# Patient Record
Sex: FEMALE | Race: WHITE | NOT HISPANIC OR LATINO | URBAN - METROPOLITAN AREA
[De-identification: names, ages, dates, MRNs, and addresses within clinical notes are randomized per-mention and may not be internally consistent; named-entity substitution may affect disease eponyms.]

---

## 2017-07-11 ENCOUNTER — INPATIENT (INPATIENT)
Facility: HOSPITAL | Age: 82
LOS: 3 days | Discharge: NURSING, REHAB OR LONG-TERM CARE | End: 2017-07-15
Attending: ORTHOPAEDIC SURGERY | Admitting: ORTHOPAEDIC SURGERY
Payer: MEDICARE

## 2017-07-11 VITALS
DIASTOLIC BLOOD PRESSURE: 100 MMHG | RESPIRATION RATE: 18 BRPM | SYSTOLIC BLOOD PRESSURE: 152 MMHG | OXYGEN SATURATION: 99 % | TEMPERATURE: 97 F | HEART RATE: 89 BPM

## 2017-07-11 DIAGNOSIS — S72.009A FRACTURE OF UNSPECIFIED PART OF NECK OF UNSPECIFIED FEMUR, INITIAL ENCOUNTER FOR CLOSED FRACTURE: ICD-10-CM

## 2017-07-11 LAB
ALBUMIN SERPL ELPH-MCNC: 3.7 G/DL — SIGNIFICANT CHANGE UP (ref 3.3–5)
ALP SERPL-CCNC: 86 U/L — SIGNIFICANT CHANGE UP (ref 40–120)
ALT FLD-CCNC: 15 U/L — SIGNIFICANT CHANGE UP (ref 4–33)
APTT BLD: 26.7 SEC — LOW (ref 27.5–37.4)
AST SERPL-CCNC: 22 U/L — SIGNIFICANT CHANGE UP (ref 4–32)
BASOPHILS # BLD AUTO: 0.05 K/UL — SIGNIFICANT CHANGE UP (ref 0–0.2)
BASOPHILS NFR BLD AUTO: 0.6 % — SIGNIFICANT CHANGE UP (ref 0–2)
BILIRUB SERPL-MCNC: 0.5 MG/DL — SIGNIFICANT CHANGE UP (ref 0.2–1.2)
BLD GP AB SCN SERPL QL: NEGATIVE — SIGNIFICANT CHANGE UP
BUN SERPL-MCNC: 25 MG/DL — HIGH (ref 7–23)
CALCIUM SERPL-MCNC: 9.4 MG/DL — SIGNIFICANT CHANGE UP (ref 8.4–10.5)
CHLORIDE SERPL-SCNC: 103 MMOL/L — SIGNIFICANT CHANGE UP (ref 98–107)
CO2 SERPL-SCNC: 25 MMOL/L — SIGNIFICANT CHANGE UP (ref 22–31)
CREAT SERPL-MCNC: 1.18 MG/DL — SIGNIFICANT CHANGE UP (ref 0.5–1.3)
EOSINOPHIL # BLD AUTO: 0.14 K/UL — SIGNIFICANT CHANGE UP (ref 0–0.5)
EOSINOPHIL NFR BLD AUTO: 1.7 % — SIGNIFICANT CHANGE UP (ref 0–6)
GLUCOSE SERPL-MCNC: 116 MG/DL — HIGH (ref 70–99)
HCT VFR BLD CALC: 39.8 % — SIGNIFICANT CHANGE UP (ref 34.5–45)
HGB BLD-MCNC: 13 G/DL — SIGNIFICANT CHANGE UP (ref 11.5–15.5)
IMM GRANULOCYTES # BLD AUTO: 0.04 # — SIGNIFICANT CHANGE UP
IMM GRANULOCYTES NFR BLD AUTO: 0.5 % — SIGNIFICANT CHANGE UP (ref 0–1.5)
INR BLD: 1 — SIGNIFICANT CHANGE UP (ref 0.88–1.17)
LYMPHOCYTES # BLD AUTO: 1.63 K/UL — SIGNIFICANT CHANGE UP (ref 1–3.3)
LYMPHOCYTES # BLD AUTO: 20 % — SIGNIFICANT CHANGE UP (ref 13–44)
MCHC RBC-ENTMCNC: 30.4 PG — SIGNIFICANT CHANGE UP (ref 27–34)
MCHC RBC-ENTMCNC: 32.7 % — SIGNIFICANT CHANGE UP (ref 32–36)
MCV RBC AUTO: 93.2 FL — SIGNIFICANT CHANGE UP (ref 80–100)
MONOCYTES # BLD AUTO: 0.73 K/UL — SIGNIFICANT CHANGE UP (ref 0–0.9)
MONOCYTES NFR BLD AUTO: 8.9 % — SIGNIFICANT CHANGE UP (ref 2–14)
NEUTROPHILS # BLD AUTO: 5.58 K/UL — SIGNIFICANT CHANGE UP (ref 1.8–7.4)
NEUTROPHILS NFR BLD AUTO: 68.3 % — SIGNIFICANT CHANGE UP (ref 43–77)
NRBC # FLD: 0 — SIGNIFICANT CHANGE UP
PLATELET # BLD AUTO: 258 K/UL — SIGNIFICANT CHANGE UP (ref 150–400)
PMV BLD: 9.8 FL — SIGNIFICANT CHANGE UP (ref 7–13)
POTASSIUM SERPL-MCNC: 4.1 MMOL/L — SIGNIFICANT CHANGE UP (ref 3.5–5.3)
POTASSIUM SERPL-SCNC: 4.1 MMOL/L — SIGNIFICANT CHANGE UP (ref 3.5–5.3)
PROT SERPL-MCNC: 6.7 G/DL — SIGNIFICANT CHANGE UP (ref 6–8.3)
PROTHROM AB SERPL-ACNC: 11.2 SEC — SIGNIFICANT CHANGE UP (ref 9.8–13.1)
RBC # BLD: 4.27 M/UL — SIGNIFICANT CHANGE UP (ref 3.8–5.2)
RBC # FLD: 13.6 % — SIGNIFICANT CHANGE UP (ref 10.3–14.5)
RH IG SCN BLD-IMP: POSITIVE — SIGNIFICANT CHANGE UP
SODIUM SERPL-SCNC: 143 MMOL/L — SIGNIFICANT CHANGE UP (ref 135–145)
WBC # BLD: 8.17 K/UL — SIGNIFICANT CHANGE UP (ref 3.8–10.5)
WBC # FLD AUTO: 8.17 K/UL — SIGNIFICANT CHANGE UP (ref 3.8–10.5)

## 2017-07-11 PROCEDURE — 72125 CT NECK SPINE W/O DYE: CPT | Mod: 26

## 2017-07-11 PROCEDURE — 70450 CT HEAD/BRAIN W/O DYE: CPT | Mod: 26

## 2017-07-11 PROCEDURE — 73523 X-RAY EXAM HIPS BI 5/> VIEWS: CPT | Mod: 26

## 2017-07-11 PROCEDURE — 73552 X-RAY EXAM OF FEMUR 2/>: CPT | Mod: 26,76,RT

## 2017-07-11 RX ORDER — MORPHINE SULFATE 50 MG/1
2 CAPSULE, EXTENDED RELEASE ORAL EVERY 4 HOURS
Qty: 0 | Refills: 0 | Status: DISCONTINUED | OUTPATIENT
Start: 2017-07-11 | End: 2017-07-13

## 2017-07-11 RX ORDER — MORPHINE SULFATE 50 MG/1
3 CAPSULE, EXTENDED RELEASE ORAL EVERY 4 HOURS
Qty: 0 | Refills: 0 | Status: DISCONTINUED | OUTPATIENT
Start: 2017-07-11 | End: 2017-07-13

## 2017-07-11 RX ORDER — HEPARIN SODIUM 5000 [USP'U]/ML
5000 INJECTION INTRAVENOUS; SUBCUTANEOUS ONCE
Qty: 0 | Refills: 0 | Status: COMPLETED | OUTPATIENT
Start: 2017-07-11 | End: 2017-07-11

## 2017-07-11 RX ORDER — ACETAMINOPHEN 500 MG
650 TABLET ORAL EVERY 6 HOURS
Qty: 0 | Refills: 0 | Status: DISCONTINUED | OUTPATIENT
Start: 2017-07-11 | End: 2017-07-15

## 2017-07-11 RX ORDER — MORPHINE SULFATE 50 MG/1
4 CAPSULE, EXTENDED RELEASE ORAL ONCE
Qty: 0 | Refills: 0 | Status: DISCONTINUED | OUTPATIENT
Start: 2017-07-11 | End: 2017-07-11

## 2017-07-11 RX ORDER — SODIUM CHLORIDE 9 MG/ML
1000 INJECTION, SOLUTION INTRAVENOUS
Qty: 0 | Refills: 0 | Status: DISCONTINUED | OUTPATIENT
Start: 2017-07-11 | End: 2017-07-15

## 2017-07-11 RX ORDER — ACETAMINOPHEN 500 MG
1000 TABLET ORAL ONCE
Qty: 0 | Refills: 0 | Status: COMPLETED | OUTPATIENT
Start: 2017-07-11 | End: 2017-07-11

## 2017-07-11 RX ADMIN — SODIUM CHLORIDE 75 MILLILITER(S): 9 INJECTION, SOLUTION INTRAVENOUS at 22:53

## 2017-07-11 RX ADMIN — MORPHINE SULFATE 4 MILLIGRAM(S): 50 CAPSULE, EXTENDED RELEASE ORAL at 19:44

## 2017-07-11 RX ADMIN — MORPHINE SULFATE 4 MILLIGRAM(S): 50 CAPSULE, EXTENDED RELEASE ORAL at 18:50

## 2017-07-11 RX ADMIN — MORPHINE SULFATE 4 MILLIGRAM(S): 50 CAPSULE, EXTENDED RELEASE ORAL at 20:36

## 2017-07-11 RX ADMIN — Medication 400 MILLIGRAM(S): at 21:24

## 2017-07-11 RX ADMIN — MORPHINE SULFATE 4 MILLIGRAM(S): 50 CAPSULE, EXTENDED RELEASE ORAL at 20:15

## 2017-07-11 RX ADMIN — Medication 1000 MILLIGRAM(S): at 22:53

## 2017-07-11 NOTE — ED ADULT TRIAGE NOTE - CHIEF COMPLAINT QUOTE
Patient brought to ER after she tripped when her foot got trapped behind her. Her right hip is externally rotated.

## 2017-07-11 NOTE — ED PROVIDER NOTE - ATTENDING CONTRIBUTION TO CARE
I performed a face to face history and physical exam of the patient and discussed their management with the resident. I reviewed the resident's note and agree with the documented findings and plan of care. 89 y/o female with hx of HTN, s/p mechanical fall witnessed by aide c/o R hip pain, unable to ambulate, + head injury no LOC, no neck pain, pt with externally rotated RLE, nvi, no focal neurologic deficit but hx of hemorrhagic bleed, 1)head ct 2)X-ray hip/pelvis 3)pre-op labs 4)pain management 5)ortho consult 6)admit

## 2017-07-11 NOTE — ED ADULT NURSE NOTE - OBJECTIVE STATEMENT
Ottoniel RN: Pt received to room 10. Pt brought in s/p trip and fall. Pts aide at bedside states that pt was walking with walker when she tripped and fell. Pt complaining of right sided hip pain. Pts right hip noted to be externally rotated. Pt complaining of 10/10 pain. MD at bedside. IV access obtained, labs drawn and sent. Report given to primary RN.

## 2017-07-11 NOTE — ED PROVIDER NOTE - OBJECTIVE STATEMENT
89 year old female with history of hypertension in with ground level fall.  States was at the movies, walking, when she stumbled and fell, no loc, but does endorse hitting the back of the head. Now with right hip/leg pain.   Denies any preceeding lightheadedness, dizziness, chest pain.  Unable to ambulate after the fall. 89 year old female with history of hypertension in with ground level fall.  States was at the movies, walking, when she stumbled and fell, no loc, but does endorse hitting the back of the head. Now with right hip/leg pain.   Denies any preceeding lightheadedness, dizziness, chest pain.  Unable to ambulate after the fall,   JPATEL: no numbness, no neck pain, no back pain,

## 2017-07-11 NOTE — ED PROVIDER NOTE - MEDICAL DECISION MAKING DETAILS
89F in with right leg/hip pain s/p fall, concern for fx, xrays, preop labs, pain control, admission

## 2017-07-11 NOTE — ED PROVIDER NOTE - PHYSICAL EXAMINATION
right leg externally rotated, + limited ROM 2/2 pain. right leg externally rotated, + limited ROM 2/2 pain.  JPATEL: RLE + DP pulse right leg externally rotated, + limited ROM 2/2 pain.  JPATEL: RLE + DP pulse, NVI

## 2017-07-12 DIAGNOSIS — Z95.2 PRESENCE OF PROSTHETIC HEART VALVE: Chronic | ICD-10-CM

## 2017-07-12 DIAGNOSIS — Z01.818 ENCOUNTER FOR OTHER PREPROCEDURAL EXAMINATION: ICD-10-CM

## 2017-07-12 DIAGNOSIS — S72.009A FRACTURE OF UNSPECIFIED PART OF NECK OF UNSPECIFIED FEMUR, INITIAL ENCOUNTER FOR CLOSED FRACTURE: ICD-10-CM

## 2017-07-12 LAB
APPEARANCE UR: CLEAR — SIGNIFICANT CHANGE UP
BILIRUB UR-MCNC: SIGNIFICANT CHANGE UP
BLD GP AB SCN SERPL QL: NEGATIVE — SIGNIFICANT CHANGE UP
BLOOD UR QL VISUAL: NEGATIVE — SIGNIFICANT CHANGE UP
BUN SERPL-MCNC: 23 MG/DL — SIGNIFICANT CHANGE UP (ref 7–23)
CALCIUM SERPL-MCNC: 9.1 MG/DL — SIGNIFICANT CHANGE UP (ref 8.4–10.5)
CHLORIDE SERPL-SCNC: 101 MMOL/L — SIGNIFICANT CHANGE UP (ref 98–107)
CO2 SERPL-SCNC: 26 MMOL/L — SIGNIFICANT CHANGE UP (ref 22–31)
COLOR SPEC: YELLOW — SIGNIFICANT CHANGE UP
CREAT SERPL-MCNC: 0.93 MG/DL — SIGNIFICANT CHANGE UP (ref 0.5–1.3)
GLUCOSE SERPL-MCNC: 139 MG/DL — HIGH (ref 70–99)
GLUCOSE UR-MCNC: NEGATIVE — SIGNIFICANT CHANGE UP
HCT VFR BLD CALC: 36.6 % — SIGNIFICANT CHANGE UP (ref 34.5–45)
HGB BLD-MCNC: 11.9 G/DL — SIGNIFICANT CHANGE UP (ref 11.5–15.5)
INR BLD: 1 — SIGNIFICANT CHANGE UP (ref 0.88–1.17)
KETONES UR-MCNC: NEGATIVE — SIGNIFICANT CHANGE UP
LEUKOCYTE ESTERASE UR-ACNC: NEGATIVE — SIGNIFICANT CHANGE UP
MCHC RBC-ENTMCNC: 31.1 PG — SIGNIFICANT CHANGE UP (ref 27–34)
MCHC RBC-ENTMCNC: 32.5 % — SIGNIFICANT CHANGE UP (ref 32–36)
MCV RBC AUTO: 95.6 FL — SIGNIFICANT CHANGE UP (ref 80–100)
NITRITE UR-MCNC: NEGATIVE — SIGNIFICANT CHANGE UP
NRBC # FLD: 0 — SIGNIFICANT CHANGE UP
PH UR: 5.5 — SIGNIFICANT CHANGE UP (ref 5–8)
PLATELET # BLD AUTO: 249 K/UL — SIGNIFICANT CHANGE UP (ref 150–400)
PMV BLD: 10.9 FL — SIGNIFICANT CHANGE UP (ref 7–13)
POTASSIUM SERPL-MCNC: 4.2 MMOL/L — SIGNIFICANT CHANGE UP (ref 3.5–5.3)
POTASSIUM SERPL-SCNC: 4.2 MMOL/L — SIGNIFICANT CHANGE UP (ref 3.5–5.3)
PROT UR-MCNC: 30 — HIGH
PROTHROM AB SERPL-ACNC: 11.2 SEC — SIGNIFICANT CHANGE UP (ref 9.8–13.1)
RBC # BLD: 3.83 M/UL — SIGNIFICANT CHANGE UP (ref 3.8–5.2)
RBC # FLD: 13.5 % — SIGNIFICANT CHANGE UP (ref 10.3–14.5)
RH IG SCN BLD-IMP: POSITIVE — SIGNIFICANT CHANGE UP
RH IG SCN BLD-IMP: POSITIVE — SIGNIFICANT CHANGE UP
SODIUM SERPL-SCNC: 140 MMOL/L — SIGNIFICANT CHANGE UP (ref 135–145)
SP GR SPEC: 1.03 — SIGNIFICANT CHANGE UP (ref 1–1.03)
UROBILINOGEN FLD QL: NORMAL E.U. — SIGNIFICANT CHANGE UP (ref 0.2–1)
WBC # BLD: 8.6 K/UL — SIGNIFICANT CHANGE UP (ref 3.8–10.5)
WBC # FLD AUTO: 8.6 K/UL — SIGNIFICANT CHANGE UP (ref 3.8–10.5)

## 2017-07-12 PROCEDURE — 93306 TTE W/DOPPLER COMPLETE: CPT | Mod: 26

## 2017-07-12 PROCEDURE — 71010: CPT | Mod: 26

## 2017-07-12 PROCEDURE — 73501 X-RAY EXAM HIP UNI 1 VIEW: CPT | Mod: 26,RT

## 2017-07-12 PROCEDURE — 99223 1ST HOSP IP/OBS HIGH 75: CPT

## 2017-07-12 RX ORDER — DOCUSATE SODIUM 100 MG
1 CAPSULE ORAL
Qty: 0 | Refills: 0 | COMMUNITY

## 2017-07-12 RX ORDER — DILTIAZEM HCL 120 MG
1 CAPSULE, EXT RELEASE 24 HR ORAL
Qty: 0 | Refills: 0 | COMMUNITY

## 2017-07-12 RX ORDER — HEPARIN SODIUM 5000 [USP'U]/ML
5000 INJECTION INTRAVENOUS; SUBCUTANEOUS ONCE
Qty: 0 | Refills: 0 | Status: COMPLETED | OUTPATIENT
Start: 2017-07-12 | End: 2017-07-12

## 2017-07-12 RX ORDER — DILTIAZEM HCL 120 MG
180 CAPSULE, EXT RELEASE 24 HR ORAL DAILY
Qty: 0 | Refills: 0 | Status: DISCONTINUED | OUTPATIENT
Start: 2017-07-12 | End: 2017-07-15

## 2017-07-12 RX ORDER — DOCUSATE SODIUM 100 MG
100 CAPSULE ORAL
Qty: 0 | Refills: 0 | Status: DISCONTINUED | OUTPATIENT
Start: 2017-07-12 | End: 2017-07-13

## 2017-07-12 RX ORDER — LEVOTHYROXINE SODIUM 125 MCG
150 TABLET ORAL DAILY
Qty: 0 | Refills: 0 | Status: DISCONTINUED | OUTPATIENT
Start: 2017-07-12 | End: 2017-07-15

## 2017-07-12 RX ORDER — ATORVASTATIN CALCIUM 80 MG/1
1 TABLET, FILM COATED ORAL
Qty: 0 | Refills: 0 | COMMUNITY

## 2017-07-12 RX ORDER — ATORVASTATIN CALCIUM 80 MG/1
20 TABLET, FILM COATED ORAL AT BEDTIME
Qty: 0 | Refills: 0 | Status: DISCONTINUED | OUTPATIENT
Start: 2017-07-12 | End: 2017-07-15

## 2017-07-12 RX ORDER — LEVOTHYROXINE SODIUM 125 MCG
1 TABLET ORAL
Qty: 0 | Refills: 0 | COMMUNITY

## 2017-07-12 RX ORDER — FUROSEMIDE 40 MG
1 TABLET ORAL
Qty: 0 | Refills: 0 | COMMUNITY

## 2017-07-12 RX ADMIN — MORPHINE SULFATE 2 MILLIGRAM(S): 50 CAPSULE, EXTENDED RELEASE ORAL at 11:11

## 2017-07-12 RX ADMIN — MORPHINE SULFATE 3 MILLIGRAM(S): 50 CAPSULE, EXTENDED RELEASE ORAL at 01:45

## 2017-07-12 RX ADMIN — MORPHINE SULFATE 2 MILLIGRAM(S): 50 CAPSULE, EXTENDED RELEASE ORAL at 11:23

## 2017-07-12 RX ADMIN — Medication 100 MILLIGRAM(S): at 08:43

## 2017-07-12 RX ADMIN — MORPHINE SULFATE 3 MILLIGRAM(S): 50 CAPSULE, EXTENDED RELEASE ORAL at 00:57

## 2017-07-12 RX ADMIN — HEPARIN SODIUM 5000 UNIT(S): 5000 INJECTION INTRAVENOUS; SUBCUTANEOUS at 11:10

## 2017-07-12 RX ADMIN — ATORVASTATIN CALCIUM 20 MILLIGRAM(S): 80 TABLET, FILM COATED ORAL at 22:04

## 2017-07-12 RX ADMIN — Medication 150 MICROGRAM(S): at 08:44

## 2017-07-12 RX ADMIN — Medication 180 MILLIGRAM(S): at 08:43

## 2017-07-12 RX ADMIN — MORPHINE SULFATE 3 MILLIGRAM(S): 50 CAPSULE, EXTENDED RELEASE ORAL at 16:14

## 2017-07-12 RX ADMIN — MORPHINE SULFATE 3 MILLIGRAM(S): 50 CAPSULE, EXTENDED RELEASE ORAL at 08:29

## 2017-07-12 RX ADMIN — MORPHINE SULFATE 3 MILLIGRAM(S): 50 CAPSULE, EXTENDED RELEASE ORAL at 16:29

## 2017-07-12 RX ADMIN — Medication 100 MILLIGRAM(S): at 18:17

## 2017-07-12 RX ADMIN — MORPHINE SULFATE 3 MILLIGRAM(S): 50 CAPSULE, EXTENDED RELEASE ORAL at 09:16

## 2017-07-12 RX ADMIN — HEPARIN SODIUM 5000 UNIT(S): 5000 INJECTION INTRAVENOUS; SUBCUTANEOUS at 00:48

## 2017-07-12 NOTE — PROGRESS NOTE ADULT - SUBJECTIVE AND OBJECTIVE BOX
Diagnosis: Right Hip Interttrochanteric Fracture   Surgeon: Dr Jairo Maza  Procedure: R Hip IMN    Labs:                        11.9   8.60  )-----------( 249      ( 2017 07:15 )             36.6       07-12    140  |  101  |  23  ----------------------------<  139<H>  4.2   |  26  |  0.93    Ca    9.1      2017 07:15    TPro  6.7  /  Alb  3.7  /  TBili  0.5  /  DBili  x   /  AST  22  /  ALT  15  /  AlkPhos  86  07-11      PT/INR - ( 2017 07:15 )   PT: 11.2 SEC;   INR: 1.00          PTT - ( 2017 18:53 )  PTT:26.7 SEC    Type and Screen X 2: 1 complete, 1 pending    Urinalysis Basic - ( 2017 07:21 )    Color: YELLOW / Appearance: CLEAR / S.028 / pH: 5.5  Gluc: NEGATIVE / Ketone: NEGATIVE  / Bili: SMALL / Urobili: NORMAL E.U.   Blood: NEGATIVE / Protein: 30 / Nitrite: NEGATIVE   Leuk Esterase: NEGATIVE / RBC: x / WBC x   Sq Epi: x / Non Sq Epi: x / Bacteria: x        EKG: In chart  ECHO: Complete  CXR: Clear Lungs  Consent: Complete  Clearance: Cleared

## 2017-07-12 NOTE — CONSULT NOTE ADULT - PROBLEM SELECTOR RECOMMENDATION 9
-per aid Lee Ann at [facility] who knows pt well, has been with her for last 5 years, pt ambulates with walker for long distances with no limitations posed by cp or sob. Pt with no h/o cad/mi, although with listed h/o diastolic HF. No h/o dm, ckd, pvd, stroke/tia. pt tolerated 2011 hip repair with no cardiovascular complications.   Pt with acceptable Tapia risk index score for OR  -d/w ortho resident -per aid Lee Ann at [facility] who knows pt well, has been with her for last 5 years, pt ambulates with walker for long distances with no limitations posed by cp or sob. Pt with no h/o cad/mi, although with listed h/o diastolic HF. No h/o dm, ckd, pvd, stroke/tia, although with h/o subdural hematoma 3 years ago.  pt tolerated 2011 hip repair with no cardiovascular complications.   Pt with acceptable Tapia risk index score for OR  -d/w ortho resident -per aid Lee Ann at [facility] who knows pt well, has been with her for last 5 years, pt ambulates with walker for long distances with no limitations posed by cp or sob. Pt with no h/o cad/mi, although with listed h/o diastolic HF. No h/o dm, ckd, pvd, stroke/tia, although with h/o subdural hematoma 3 years ago.  pt tolerated 2011 hip repair with no cardiovascular complications.   Pt with acceptable Tapia risk index score for OR; however, in setting of known valvular disease, would obtain TTE prior to OR  -d/w ortho resident

## 2017-07-12 NOTE — H&P ADULT - NSHPPHYSICALEXAM_GEN_ALL_CORE
Exam:  Gen: NAD, skin intact, RLE shortened, externally rotated  Motor: EHL/FHL/TA/Gastrocnemius grossly intact  Sensory: SILT DP/SP/S/S/T Nerve Distributions  Vascular: 2+ Dorsalis Pedis pulse

## 2017-07-12 NOTE — H&P ADULT - HISTORY OF PRESENT ILLNESS
89 year old female presented to the [facility] ED following a fall at the movies. Patient tripped over her feet and fell onto her right side. She was unable to ambulate following the fall. At baseline patient walks with a walker. She does not take any anticoagulants. She reports hitting the back of her head but there was no LOC. No chest pain or shortness of breath. No recent fevers or chills.

## 2017-07-12 NOTE — H&P ADULT - ASSESSMENT
A/P: 89 year old female with right IT fracture  - Admit to Ortho  - OR Planning  - Preoperative Labs: CBC, BMP, PT/INR, Type and Screen, UA, CXR, EKG  - Consent in chart  - Medicine Clearance  - NPO  - Hold anticoagulation for OR

## 2017-07-12 NOTE — PROGRESS NOTE ADULT - SUBJECTIVE AND OBJECTIVE BOX
Orthopaedic Surgery Preop Note    Procedure: R IM Nail  Surgeon: Link    07-11    143  |  103  |  25<H>  ----------------------------<  116<H>  4.1   |  25  |  1.18    Ca    9.4      11 Jul 2017 18:53    TPro  6.7  /  Alb  3.7  /  TBili  0.5  /  DBili  x   /  AST  22  /  ALT  15  /  AlkPhos  86  07-11                          13.0   8.17  )-----------( 258      ( 11 Jul 2017 18:53 )             39.8     PT/INR - ( 11 Jul 2017 18:53 )   PT: 11.2 SEC;   INR: 1.00          PTT - ( 11 Jul 2017 18:53 )  PTT:26.7 SEC    - T/S x 1 done    89y Female to undergo xx  - NPO pMN  - IVF when NPO  - Consent in chart  - Plan for OR tomorrow

## 2017-07-12 NOTE — CONSULT NOTE ADULT - SUBJECTIVE AND OBJECTIVE BOX
Patient is a 89y old  Female who presents with a chief complaint of Hip Fracture (12 Jul 2017 00:11)      HPI:  89 year old female presented to the [facility] ED following a fall at the movies. Patient tripped over her feet and fell onto her right side. She was unable to ambulate following the fall. At baseline patient walks with a walker. She does not take any anticoagulants. She reports hitting the back of her head but there was no LOC. No chest pain or shortness of breath. No recent fevers or chills. (12 Jul 2017 00:11). Imaging + for right hip fracture      History limited due to: [ ] Dementia      Pain Symptoms if applicable:             	                          7-10  Pain:  Location: right  hip	  Modifying factors:	  Associated symptoms:	    Function:ambulates with cane    Allergies    No Known Allergies    Intolerances        HOME MEDICATIONS: [ ] Reviewed    MEDICATIONS  (STANDING):  lactated ringers. 1000 milliLiter(s) (75 mL/Hr) IV Continuous <Continuous>  heparin  Injectable 5000 Unit(s) SubCutaneous once  diltiazem    milliGRAM(s) Oral daily  atorvastatin 20 milliGRAM(s) Oral at bedtime  docusate sodium 100 milliGRAM(s) Oral two times a day  levothyroxine 150 MICROGram(s) Oral daily    MEDICATIONS  (PRN):  morphine  - Injectable 2 milliGRAM(s) IV Push every 4 hours PRN Moderate Pain (4 - 6)  morphine  - Injectable 3 milliGRAM(s) IV Push every 4 hours PRN Severe Pain (7 - 10)  acetaminophen   Tablet 650 milliGRAM(s) Oral every 6 hours PRN For Temp greater than 38 C (100.4 F)      PAST MEDICAL & SURGICAL HISTORY:  Hypertension  S/P AVR  diastolic chf    REVIEW OF SYSTEMS:       Unable to obtain due to poor mental status, does endorse right leg pain. Denies cp, sob    Vital Signs Last 24 Hrs  T(C): 36.1 (11 Jul 2017 21:12), Max: 36.7 (11 Jul 2017 18:52)  T(F): 97 (11 Jul 2017 21:12), Max: 98 (11 Jul 2017 18:52)  HR: 97 (11 Jul 2017 21:12) (78 - 97)  BP: 180/84 (11 Jul 2017 21:12) (152/100 - 180/84)  BP(mean): --  RR: 18 (11 Jul 2017 21:12) (18 - 18)  SpO2: 98% (11 Jul 2017 21:12) (98% - 99%)    PHYSICAL EXAM:    GENERAL: NAD, well-groomed, well-developed  HEAD:  Atraumatic, Normocephalic  EYES: EOMI, PERRLA, conjunctiva and sclera clear  ENMT: Moist mucous membranes  NECK: Supple, No JVD  RESPIRATORY: Clear to auscultation bilaterally; No rales, rhonchi, wheezing, or rubs  CARDIOVASCULAR: Regular rate and rhythm; No murmurs, rubs, or gallops  GASTROINTESTINAL: Soft, Nontender, Nondistended; Bowel sounds present  GENITOURINARY: Not examined  EXTREMITIES:  2+ Peripheral Pulses, No clubbing, cyanosis, or edema  NERVOUS SYSTEM:  follows commands, a/o x2, poor short-term recall  HEME/LYMPH: No lymphadenopathy noted  SKIN: No rashes or lesions; Incisions C/D/I    LABS:                        13.0   8.17  )-----------( 258      ( 11 Jul 2017 18:53 )             39.8     Hemoglobin: 13.0 g/dL (07-11 @ 18:53)    07-11    143  |  103  |  25<H>  ----------------------------<  116<H>  4.1   |  25  |  1.18    Ca    9.4      11 Jul 2017 18:53    TPro  6.7  /  Alb  3.7  /  TBili  0.5  /  DBili  x   /  AST  22  /  ALT  15  /  AlkPhos  86  07-11    PT/INR - ( 11 Jul 2017 18:53 )   PT: 11.2 SEC;   INR: 1.00          PTT - ( 11 Jul 2017 18:53 )  PTT:26.7 SEC    CAPILLARY BLOOD GLUCOSE              Imaging:   Personally Reviewed:  [ ] YES               [

## 2017-07-12 NOTE — PROGRESS NOTE ADULT - PROBLEM SELECTOR PLAN 1
-NPO and IVF @ midnight  -pain control/analgesia  -Hold Anticoagulation  -Inc Spirometry  -Cleared by Medical Attending  -F/U Pending AM Labs  -Notify Ortho with any questions

## 2017-07-12 NOTE — PROGRESS NOTE ADULT - SUBJECTIVE AND OBJECTIVE BOX
No acute events overnight. Pain well controlled with pain medications.    Vital Signs Last 24 Hrs  T(C): 36.7 (12 Jul 2017 00:58), Max: 36.7 (11 Jul 2017 18:52)  T(F): 98 (12 Jul 2017 00:58), Max: 98 (11 Jul 2017 18:52)  HR: 89 (12 Jul 2017 00:58) (78 - 97)  BP: 159/82 (12 Jul 2017 00:58) (152/100 - 180/84)  BP(mean): --  RR: 18 (12 Jul 2017 00:58) (18 - 18)  SpO2: 100% (12 Jul 2017 00:58) (98% - 100%)    Exam:  Gen: NAD, skin intact  Motor: EHL/FHL/TA/Gastrocnemius grossly intact  Sensory: SILT DP/SP/S/S/T nerve distributions  Dressing: Clean, Dry, Intact    A/P: 89 year old female with R IT fx  - Pain Control  - NPO  - Hold AC for OR  - Follow up TTE  - Follow up Medicine Clearance  - OR today

## 2017-07-13 DIAGNOSIS — E03.9 HYPOTHYROIDISM, UNSPECIFIED: ICD-10-CM

## 2017-07-13 DIAGNOSIS — I10 ESSENTIAL (PRIMARY) HYPERTENSION: ICD-10-CM

## 2017-07-13 LAB
APTT BLD: 28.7 SEC — SIGNIFICANT CHANGE UP (ref 27.5–37.4)
BASOPHILS # BLD AUTO: 0.02 K/UL — SIGNIFICANT CHANGE UP (ref 0–0.2)
BASOPHILS NFR BLD AUTO: 0.2 % — SIGNIFICANT CHANGE UP (ref 0–2)
BUN SERPL-MCNC: 18 MG/DL — SIGNIFICANT CHANGE UP (ref 7–23)
BUN SERPL-MCNC: 18 MG/DL — SIGNIFICANT CHANGE UP (ref 7–23)
CALCIUM SERPL-MCNC: 8.6 MG/DL — SIGNIFICANT CHANGE UP (ref 8.4–10.5)
CALCIUM SERPL-MCNC: 8.7 MG/DL — SIGNIFICANT CHANGE UP (ref 8.4–10.5)
CHLORIDE SERPL-SCNC: 103 MMOL/L — SIGNIFICANT CHANGE UP (ref 98–107)
CHLORIDE SERPL-SCNC: 99 MMOL/L — SIGNIFICANT CHANGE UP (ref 98–107)
CO2 SERPL-SCNC: 24 MMOL/L — SIGNIFICANT CHANGE UP (ref 22–31)
CO2 SERPL-SCNC: 25 MMOL/L — SIGNIFICANT CHANGE UP (ref 22–31)
CREAT SERPL-MCNC: 0.84 MG/DL — SIGNIFICANT CHANGE UP (ref 0.5–1.3)
CREAT SERPL-MCNC: 0.9 MG/DL — SIGNIFICANT CHANGE UP (ref 0.5–1.3)
EOSINOPHIL # BLD AUTO: 0.02 K/UL — SIGNIFICANT CHANGE UP (ref 0–0.5)
EOSINOPHIL NFR BLD AUTO: 0.2 % — SIGNIFICANT CHANGE UP (ref 0–6)
GLUCOSE SERPL-MCNC: 122 MG/DL — HIGH (ref 70–99)
GLUCOSE SERPL-MCNC: 134 MG/DL — HIGH (ref 70–99)
HCT VFR BLD CALC: 27.7 % — LOW (ref 34.5–45)
HCT VFR BLD CALC: 32.4 % — LOW (ref 34.5–45)
HGB BLD-MCNC: 10.9 G/DL — LOW (ref 11.5–15.5)
HGB BLD-MCNC: 9.2 G/DL — LOW (ref 11.5–15.5)
IMM GRANULOCYTES # BLD AUTO: 0.07 # — SIGNIFICANT CHANGE UP
IMM GRANULOCYTES NFR BLD AUTO: 0.6 % — SIGNIFICANT CHANGE UP (ref 0–1.5)
INR BLD: 1.16 — SIGNIFICANT CHANGE UP (ref 0.88–1.17)
LYMPHOCYTES # BLD AUTO: 0.65 K/UL — LOW (ref 1–3.3)
LYMPHOCYTES # BLD AUTO: 6 % — LOW (ref 13–44)
MCHC RBC-ENTMCNC: 32.1 PG — SIGNIFICANT CHANGE UP (ref 27–34)
MCHC RBC-ENTMCNC: 32.3 PG — SIGNIFICANT CHANGE UP (ref 27–34)
MCHC RBC-ENTMCNC: 33.2 % — SIGNIFICANT CHANGE UP (ref 32–36)
MCHC RBC-ENTMCNC: 33.6 % — SIGNIFICANT CHANGE UP (ref 32–36)
MCV RBC AUTO: 96.1 FL — SIGNIFICANT CHANGE UP (ref 80–100)
MCV RBC AUTO: 96.5 FL — SIGNIFICANT CHANGE UP (ref 80–100)
MONOCYTES # BLD AUTO: 0.88 K/UL — SIGNIFICANT CHANGE UP (ref 0–0.9)
MONOCYTES NFR BLD AUTO: 8.1 % — SIGNIFICANT CHANGE UP (ref 2–14)
NEUTROPHILS # BLD AUTO: 9.28 K/UL — HIGH (ref 1.8–7.4)
NEUTROPHILS NFR BLD AUTO: 84.9 % — HIGH (ref 43–77)
NRBC # FLD: 0 — SIGNIFICANT CHANGE UP
NRBC # FLD: 0 — SIGNIFICANT CHANGE UP
PLATELET # BLD AUTO: 189 K/UL — SIGNIFICANT CHANGE UP (ref 150–400)
PLATELET # BLD AUTO: 210 K/UL — SIGNIFICANT CHANGE UP (ref 150–400)
PMV BLD: 10.5 FL — SIGNIFICANT CHANGE UP (ref 7–13)
PMV BLD: 10.6 FL — SIGNIFICANT CHANGE UP (ref 7–13)
POTASSIUM SERPL-MCNC: 4.2 MMOL/L — SIGNIFICANT CHANGE UP (ref 3.5–5.3)
POTASSIUM SERPL-MCNC: 4.3 MMOL/L — SIGNIFICANT CHANGE UP (ref 3.5–5.3)
POTASSIUM SERPL-SCNC: 4.2 MMOL/L — SIGNIFICANT CHANGE UP (ref 3.5–5.3)
POTASSIUM SERPL-SCNC: 4.3 MMOL/L — SIGNIFICANT CHANGE UP (ref 3.5–5.3)
PROTHROM AB SERPL-ACNC: 13 SEC — SIGNIFICANT CHANGE UP (ref 9.8–13.1)
RBC # BLD: 2.87 M/UL — LOW (ref 3.8–5.2)
RBC # BLD: 3.37 M/UL — LOW (ref 3.8–5.2)
RBC # FLD: 13.5 % — SIGNIFICANT CHANGE UP (ref 10.3–14.5)
RBC # FLD: 13.6 % — SIGNIFICANT CHANGE UP (ref 10.3–14.5)
SODIUM SERPL-SCNC: 138 MMOL/L — SIGNIFICANT CHANGE UP (ref 135–145)
SODIUM SERPL-SCNC: 140 MMOL/L — SIGNIFICANT CHANGE UP (ref 135–145)
WBC # BLD: 10.92 K/UL — HIGH (ref 3.8–10.5)
WBC # BLD: 11.2 K/UL — HIGH (ref 3.8–10.5)
WBC # FLD AUTO: 10.92 K/UL — HIGH (ref 3.8–10.5)
WBC # FLD AUTO: 11.2 K/UL — HIGH (ref 3.8–10.5)

## 2017-07-13 PROCEDURE — 99233 SBSQ HOSP IP/OBS HIGH 50: CPT

## 2017-07-13 RX ORDER — DOCUSATE SODIUM 100 MG
100 CAPSULE ORAL THREE TIMES A DAY
Qty: 0 | Refills: 0 | Status: DISCONTINUED | OUTPATIENT
Start: 2017-07-13 | End: 2017-07-13

## 2017-07-13 RX ORDER — OXYCODONE HYDROCHLORIDE 5 MG/1
5 TABLET ORAL EVERY 4 HOURS
Qty: 0 | Refills: 0 | Status: DISCONTINUED | OUTPATIENT
Start: 2017-07-13 | End: 2017-07-15

## 2017-07-13 RX ORDER — FAMOTIDINE 10 MG/ML
20 INJECTION INTRAVENOUS EVERY 12 HOURS
Qty: 0 | Refills: 0 | Status: DISCONTINUED | OUTPATIENT
Start: 2017-07-13 | End: 2017-07-15

## 2017-07-13 RX ORDER — ONDANSETRON 8 MG/1
4 TABLET, FILM COATED ORAL EVERY 6 HOURS
Qty: 0 | Refills: 0 | Status: DISCONTINUED | OUTPATIENT
Start: 2017-07-13 | End: 2017-07-15

## 2017-07-13 RX ORDER — HYDROMORPHONE HYDROCHLORIDE 2 MG/ML
0.5 INJECTION INTRAMUSCULAR; INTRAVENOUS; SUBCUTANEOUS EVERY 6 HOURS
Qty: 0 | Refills: 0 | Status: DISCONTINUED | OUTPATIENT
Start: 2017-07-13 | End: 2017-07-15

## 2017-07-13 RX ORDER — ALBUMIN HUMAN 25 %
250 VIAL (ML) INTRAVENOUS ONCE
Qty: 0 | Refills: 0 | Status: COMPLETED | OUTPATIENT
Start: 2017-07-13 | End: 2017-07-13

## 2017-07-13 RX ORDER — ENOXAPARIN SODIUM 100 MG/ML
30 INJECTION SUBCUTANEOUS EVERY 24 HOURS
Qty: 0 | Refills: 0 | Status: DISCONTINUED | OUTPATIENT
Start: 2017-07-13 | End: 2017-07-15

## 2017-07-13 RX ORDER — MAGNESIUM HYDROXIDE 400 MG/1
30 TABLET, CHEWABLE ORAL DAILY
Qty: 0 | Refills: 0 | Status: DISCONTINUED | OUTPATIENT
Start: 2017-07-13 | End: 2017-07-15

## 2017-07-13 RX ORDER — CEFAZOLIN SODIUM 1 G
2000 VIAL (EA) INJECTION EVERY 8 HOURS
Qty: 0 | Refills: 0 | Status: COMPLETED | OUTPATIENT
Start: 2017-07-13 | End: 2017-07-14

## 2017-07-13 RX ORDER — ACETAMINOPHEN 500 MG
650 TABLET ORAL EVERY 6 HOURS
Qty: 0 | Refills: 0 | Status: DISCONTINUED | OUTPATIENT
Start: 2017-07-13 | End: 2017-07-15

## 2017-07-13 RX ADMIN — Medication 100 MILLIGRAM(S): at 05:45

## 2017-07-13 RX ADMIN — ATORVASTATIN CALCIUM 20 MILLIGRAM(S): 80 TABLET, FILM COATED ORAL at 23:16

## 2017-07-13 RX ADMIN — Medication 180 MILLIGRAM(S): at 05:45

## 2017-07-13 RX ADMIN — Medication 500 MILLILITER(S): at 17:22

## 2017-07-13 RX ADMIN — SODIUM CHLORIDE 75 MILLILITER(S): 9 INJECTION, SOLUTION INTRAVENOUS at 07:59

## 2017-07-13 RX ADMIN — MORPHINE SULFATE 2 MILLIGRAM(S): 50 CAPSULE, EXTENDED RELEASE ORAL at 08:15

## 2017-07-13 RX ADMIN — Medication 150 MICROGRAM(S): at 05:46

## 2017-07-13 RX ADMIN — MORPHINE SULFATE 2 MILLIGRAM(S): 50 CAPSULE, EXTENDED RELEASE ORAL at 08:00

## 2017-07-13 RX ADMIN — Medication 100 MILLIGRAM(S): at 23:16

## 2017-07-13 NOTE — PROGRESS NOTE ADULT - ASSESSMENT
89 yo f for preop clearance for right hip fracture following mechanical fall 89 y.o. Female w/ hx HTN, hypothyroidism, s/p AVR 10 years ago p/w mechanical Fall c/w Right hip fracture for ORIF today.

## 2017-07-13 NOTE — BRIEF OPERATIVE NOTE - OPERATION/FINDINGS
Dx: R IT hip fracture  Procedure: R hip IMN. 125 degree short gamma nail. 90 lag screw. 35 distal locking screw

## 2017-07-13 NOTE — PROGRESS NOTE ADULT - SUBJECTIVE AND OBJECTIVE BOX
PRE-OP NOTE    This is a 89 year old female who fell while walking to go to a movie sustaining a right intertrochanteric hip fracture.  She has been medically optimized by the medical service in preparation for surgery and medically cleared.  She is indicated for an ORIF using a short Gamma nail fixation device to stabilize her fracture, decrease her pain, and allow her to get OOB.  I have spoken with the patient's son and the patient and even though the patient is a high risk candidate for any surgical procedure due to her age this is a reasonable procedure to perform to stabilize her fracture and decrease her pain.  Non operative treatment also has a high risk for complications including death from pneumonia and DVT and PE (the most common reasons).  The risks  of surgery are anesthesia, infection, blood loss transfusion, DVT, PE, loss of fixation, nerve injury but these risks are outweighed by the benefits of surgery already listed.  She is indicated for an ORIF of the right hip to be performed later on today.

## 2017-07-13 NOTE — PROGRESS NOTE ADULT - SUBJECTIVE AND OBJECTIVE BOX
ORTHO PROGRESS NOTE     Pt seen and examined at bedside.  No significant overnight events. Pain well controlled.    ICU Vital Signs Last 24 Hrs  T(C): 36.7 (13 Jul 2017 05:44), Max: 36.9 (12 Jul 2017 22:03)  T(F): 98.1 (13 Jul 2017 05:44), Max: 98.5 (12 Jul 2017 22:03)  HR: 97 (13 Jul 2017 05:44) (83 - 98)  BP: 136/71 (13 Jul 2017 05:44) (116/66 - 150/66)  BP(mean): --  ABP: --  ABP(mean): --  RR: 18 (13 Jul 2017 05:44) (18 - 19)  SpO2: 97% (13 Jul 2017 05:44) (95% - 97%)    Gen: No apparent distress, alert    LE:  skin intact          +DF/PF. moving toes          SILT, wwp at foot          compartments soft    A/P: 89 year old female with R IT fx  - Pain Control  - NPO  - Hold AC for OR  - Follow up Medicine Clearance note  - OR today

## 2017-07-13 NOTE — PROGRESS NOTE ADULT - SUBJECTIVE AND OBJECTIVE BOX
Patient is a 89y old  Female who presents with a chief complaint of Hip Fracture (2017 00:11)      SUBJECTIVE / OVERNIGHT EVENTS:  Patient has no new complaints. No cp, SOB, abdominal pain, N/V/D.     MEDICATIONS  (STANDING):  lactated ringers. 1000 milliLiter(s) (75 mL/Hr) IV Continuous <Continuous>  diltiazem    milliGRAM(s) Oral daily  atorvastatin 20 milliGRAM(s) Oral at bedtime  docusate sodium 100 milliGRAM(s) Oral two times a day  levothyroxine 150 MICROGram(s) Oral daily    MEDICATIONS  (PRN):  morphine  - Injectable 2 milliGRAM(s) IV Push every 4 hours PRN Moderate Pain (4 - 6)  morphine  - Injectable 3 milliGRAM(s) IV Push every 4 hours PRN Severe Pain (7 - 10)  acetaminophen   Tablet 650 milliGRAM(s) Oral every 6 hours PRN For Temp greater than 38 C (100.4 F)      Vital Signs Last 24 Hrs  T(C): 37.6 (17 @ 09:56), Max: 37.6 (17 @ 09:56)  HR: 86 (17 @ 09:56) (86 - 98)  BP: 111/52 (17 @ 09:56) (111/52 - 150/66)  RR: 17 (17 @ 09:56) (17 - 19)  SpO2: 93% (17 @ 09:56) (93% - 97%)  CAPILLARY BLOOD GLUCOSE        I&O's Summary      -  2017 07:00  --------------------------------------------------------  IN: 0 mL / OUT: 200 mL / NET: -200 mL      -  2017 12:49  --------------------------------------------------------  IN: 0 mL / OUT: 500 mL / NET: -500 mL        PHYSICAL EXAM:  GENERAL: NAD, well-developed  HEAD:  Atraumatic, Normocephalic  EYES: EOMI, PERRLA, conjunctiva and sclera clear  NECK: Supple, No JVD  CHEST/LUNG: Clear to auscultation bilaterally; No wheeze  HEART: Regular rate and rhythm; No murmurs, rubs, or gallops  ABDOMEN: Soft, Nontender, Nondistended; Bowel sounds present  EXTREMITIES:  2+ Peripheral Pulses, No clubbing, cyanosis, or edema  PSYCH: AAOx3  NEUROLOGY: non-focal  SKIN: No rashes or lesions    LABS:                        10.9   11.20 )-----------( 210      ( 2017 06:50 )             32.4     07-    138  |  99  |  18  ----------------------------<  122<H>  4.3   |  24  |  0.84    Ca    8.7      2017 06:50    TPro  6.7  /  Alb  3.7  /  TBili  0.5  /  DBili  x   /  AST  22  /  ALT  15  /  AlkPhos  86  07-11    PT/INR - ( 2017 06:50 )   PT: 13.0 SEC;   INR: 1.16          PTT - ( 2017 06:50 )  PTT:28.7 SEC      Urinalysis Basic - ( 2017 07:21 )    Color: YELLOW / Appearance: CLEAR / S.028 / pH: 5.5  Gluc: NEGATIVE / Ketone: NEGATIVE  / Bili: SMALL / Urobili: NORMAL E.U.   Blood: NEGATIVE / Protein: 30 / Nitrite: NEGATIVE   Leuk Esterase: NEGATIVE / RBC: x / WBC x   Sq Epi: x / Non Sq Epi: x / Bacteria: x        RADIOLOGY & ADDITIONAL TESTS:    Imaging Personally Reviewed: < from: Transthoracic Echocardiogram (17 @ 08:29) >  1. Mitral annular calcification, otherwise normal mitral  valve. Minimal mitral regurgitation.  2. History of bioprosthetic aortic valve replacement, which  is not well visualized. Peak transaortic valve gradient  equals 51 mm Hg, mean transaortic valve gradient equals 25  mm Hg, which is elevated even in the presence of a  prosthetic valve.  3. Endocardium not well visualized; grossly normal left  ventricular systolic function.  4. The right ventricle is not well visualized; grossly  normal right ventricular systolic function.  5. Estimated pulmonary artery systolic pressure equals 43  mm Hg, assuming right atrial pressure equals 10  mm Hg,  consistent with mild pulmonary hypertension.    < end of copied text >      Consultant(s) Notes Reviewed:      Care Discussed with Consultants/Other Providers: Ortho

## 2017-07-13 NOTE — PROGRESS NOTE ADULT - PROBLEM SELECTOR PLAN 1
patient at moderate risk for moderate risk procedure. no objection to proceeding to OR without further testing.

## 2017-07-14 DIAGNOSIS — I95.9 HYPOTENSION, UNSPECIFIED: ICD-10-CM

## 2017-07-14 DIAGNOSIS — M25.551 PAIN IN RIGHT HIP: ICD-10-CM

## 2017-07-14 DIAGNOSIS — D62 ACUTE POSTHEMORRHAGIC ANEMIA: ICD-10-CM

## 2017-07-14 PROBLEM — I10 ESSENTIAL (PRIMARY) HYPERTENSION: Chronic | Status: ACTIVE | Noted: 2017-07-11

## 2017-07-14 LAB
BUN SERPL-MCNC: 20 MG/DL — SIGNIFICANT CHANGE UP (ref 7–23)
CALCIUM SERPL-MCNC: 8.4 MG/DL — SIGNIFICANT CHANGE UP (ref 8.4–10.5)
CHLORIDE SERPL-SCNC: 102 MMOL/L — SIGNIFICANT CHANGE UP (ref 98–107)
CO2 SERPL-SCNC: 24 MMOL/L — SIGNIFICANT CHANGE UP (ref 22–31)
CREAT SERPL-MCNC: 0.84 MG/DL — SIGNIFICANT CHANGE UP (ref 0.5–1.3)
GLUCOSE SERPL-MCNC: 178 MG/DL — HIGH (ref 70–99)
HCT VFR BLD CALC: 23.9 % — LOW (ref 34.5–45)
HCT VFR BLD CALC: 27.3 % — LOW (ref 34.5–45)
HGB BLD-MCNC: 8 G/DL — LOW (ref 11.5–15.5)
HGB BLD-MCNC: 9 G/DL — LOW (ref 11.5–15.5)
MCHC RBC-ENTMCNC: 29.9 PG — SIGNIFICANT CHANGE UP (ref 27–34)
MCHC RBC-ENTMCNC: 31.1 PG — SIGNIFICANT CHANGE UP (ref 27–34)
MCHC RBC-ENTMCNC: 33 % — SIGNIFICANT CHANGE UP (ref 32–36)
MCHC RBC-ENTMCNC: 33.5 % — SIGNIFICANT CHANGE UP (ref 32–36)
MCV RBC AUTO: 90.7 FL — SIGNIFICANT CHANGE UP (ref 80–100)
MCV RBC AUTO: 93 FL — SIGNIFICANT CHANGE UP (ref 80–100)
NRBC # FLD: 0 — SIGNIFICANT CHANGE UP
NRBC # FLD: 0 — SIGNIFICANT CHANGE UP
PLATELET # BLD AUTO: 178 K/UL — SIGNIFICANT CHANGE UP (ref 150–400)
PLATELET # BLD AUTO: 184 K/UL — SIGNIFICANT CHANGE UP (ref 150–400)
PMV BLD: 11.2 FL — SIGNIFICANT CHANGE UP (ref 7–13)
PMV BLD: 11.2 FL — SIGNIFICANT CHANGE UP (ref 7–13)
POTASSIUM SERPL-MCNC: 4.3 MMOL/L — SIGNIFICANT CHANGE UP (ref 3.5–5.3)
POTASSIUM SERPL-SCNC: 4.3 MMOL/L — SIGNIFICANT CHANGE UP (ref 3.5–5.3)
RBC # BLD: 2.57 M/UL — LOW (ref 3.8–5.2)
RBC # BLD: 3.01 M/UL — LOW (ref 3.8–5.2)
RBC # FLD: 13.5 % — SIGNIFICANT CHANGE UP (ref 10.3–14.5)
RBC # FLD: 15.7 % — HIGH (ref 10.3–14.5)
SODIUM SERPL-SCNC: 137 MMOL/L — SIGNIFICANT CHANGE UP (ref 135–145)
WBC # BLD: 11.17 K/UL — HIGH (ref 3.8–10.5)
WBC # BLD: 8.97 K/UL — SIGNIFICANT CHANGE UP (ref 3.8–10.5)
WBC # FLD AUTO: 11.17 K/UL — HIGH (ref 3.8–10.5)
WBC # FLD AUTO: 8.97 K/UL — SIGNIFICANT CHANGE UP (ref 3.8–10.5)

## 2017-07-14 PROCEDURE — 99233 SBSQ HOSP IP/OBS HIGH 50: CPT

## 2017-07-14 RX ORDER — SODIUM CHLORIDE 9 MG/ML
250 INJECTION INTRAMUSCULAR; INTRAVENOUS; SUBCUTANEOUS ONCE
Qty: 0 | Refills: 0 | Status: COMPLETED | OUTPATIENT
Start: 2017-07-14 | End: 2017-07-14

## 2017-07-14 RX ADMIN — OXYCODONE HYDROCHLORIDE 5 MILLIGRAM(S): 5 TABLET ORAL at 22:46

## 2017-07-14 RX ADMIN — ENOXAPARIN SODIUM 30 MILLIGRAM(S): 100 INJECTION SUBCUTANEOUS at 08:09

## 2017-07-14 RX ADMIN — OXYCODONE HYDROCHLORIDE 5 MILLIGRAM(S): 5 TABLET ORAL at 23:15

## 2017-07-14 RX ADMIN — Medication 180 MILLIGRAM(S): at 08:09

## 2017-07-14 RX ADMIN — FAMOTIDINE 20 MILLIGRAM(S): 10 INJECTION INTRAVENOUS at 21:16

## 2017-07-14 RX ADMIN — ATORVASTATIN CALCIUM 20 MILLIGRAM(S): 80 TABLET, FILM COATED ORAL at 21:16

## 2017-07-14 RX ADMIN — SODIUM CHLORIDE 250 MILLILITER(S): 9 INJECTION INTRAMUSCULAR; INTRAVENOUS; SUBCUTANEOUS at 11:02

## 2017-07-14 RX ADMIN — Medication 100 MILLIGRAM(S): at 07:12

## 2017-07-14 RX ADMIN — FAMOTIDINE 20 MILLIGRAM(S): 10 INJECTION INTRAVENOUS at 07:12

## 2017-07-14 RX ADMIN — SODIUM CHLORIDE 75 MILLILITER(S): 9 INJECTION, SOLUTION INTRAVENOUS at 07:22

## 2017-07-14 RX ADMIN — Medication 150 MICROGRAM(S): at 07:12

## 2017-07-14 NOTE — DISCHARGE NOTE ADULT - INSTRUCTIONS
notify Dr Maza if you experience any increase in pain not relieved with pain medication, or if you develop any redness, drainage or swelling around incision or if you develop a fever >100.5.  Use over the counter stool softeners to assist with constipation which can be a side effect of your pain medication. notify Dr Maza if you experience any increase in pain not relieved with pain medication, or if you develop any redness, drainage or swelling around incision or if you develop a fever >100.5.  Use over the counter stool softeners to assist with constipation which can be a side effect of your pain medication.  Drink plenty of fluids

## 2017-07-14 NOTE — PROGRESS NOTE ADULT - SUBJECTIVE AND OBJECTIVE BOX
Patient is a 89y old  Female who presents with a chief complaint of Hip Fracture (12 Jul 2017 00:11)      SUBJECTIVE / OVERNIGHT EVENTS:    MEDICATIONS  (STANDING):  lactated ringers. 1000 milliLiter(s) (75 mL/Hr) IV Continuous <Continuous>  diltiazem    milliGRAM(s) Oral daily  atorvastatin 20 milliGRAM(s) Oral at bedtime  levothyroxine 150 MICROGram(s) Oral daily  enoxaparin Injectable 30 milliGRAM(s) SubCutaneous every 24 hours  famotidine    Tablet 20 milliGRAM(s) Oral every 12 hours    MEDICATIONS  (PRN):  acetaminophen   Tablet 650 milliGRAM(s) Oral every 6 hours PRN For Temp greater than 38 C (100.4 F)  acetaminophen   Tablet. 650 milliGRAM(s) Oral every 6 hours PRN Mild Pain (1 - 3)  oxyCODONE    IR 5 milliGRAM(s) Oral every 4 hours PRN moderate to severe pain  HYDROmorphone  Injectable 0.5 milliGRAM(s) IV Push every 6 hours PRN breakthrough pain  aluminum hydroxide/magnesium hydroxide/simethicone Suspension 30 milliLiter(s) Oral four times a day PRN Indigestion  ondansetron Injectable 4 milliGRAM(s) IV Push every 6 hours PRN Nausea and/or Vomiting  magnesium hydroxide Suspension 30 milliLiter(s) Oral daily PRN Constipation      Vital Signs Last 24 Hrs  T(C): 37.2 (07-14-17 @ 09:31), Max: 38.3 (07-13-17 @ 14:48)  HR: 85 (07-14-17 @ 09:31) (79 - 91)  BP: 87/36 (07-14-17 @ 09:31) (87/36 - 152/54)  RR: 17 (07-14-17 @ 09:31) (13 - 24)  SpO2: 98% (07-14-17 @ 09:31) (93% - 100%)  CAPILLARY BLOOD GLUCOSE        I&O's Summary    13 Jul 2017 07:01  -  14 Jul 2017 07:00  --------------------------------------------------------  IN: 400 mL / OUT: 1024 mL / NET: -624 mL        PHYSICAL EXAM:  GENERAL: NAD, well-developed  HEAD:  Atraumatic, Normocephalic  EYES: EOMI, PERRLA, conjunctiva and sclera clear  NECK: Supple, No JVD  CHEST/LUNG: Clear to auscultation bilaterally; No wheeze  HEART: Regular rate and rhythm; No murmurs, rubs, or gallops  ABDOMEN: Soft, Nontender, Nondistended; Bowel sounds present  EXTREMITIES:  2+ Peripheral Pulses, No clubbing, cyanosis, or edema  PSYCH: AAOx3  NEUROLOGY: non-focal  SKIN: No rashes or lesions    LABS:                        8.0    8.97  )-----------( 178      ( 14 Jul 2017 05:47 )             23.9     07-14    137  |  102  |  20  ----------------------------<  178<H>  4.3   |  24  |  0.84    Ca    8.4      14 Jul 2017 05:47      PT/INR - ( 13 Jul 2017 06:50 )   PT: 13.0 SEC;   INR: 1.16          PTT - ( 13 Jul 2017 06:50 )  PTT:28.7 SEC          RADIOLOGY & ADDITIONAL TESTS:    Imaging Personally Reviewed:    Consultant(s) Notes Reviewed:      Care Discussed with Consultants/Other Providers: Patient is a 89y old  Female who presents with a chief complaint of Hip Fracture (12 Jul 2017 00:11)      SUBJECTIVE / OVERNIGHT EVENTS:  patient feels well. No cp, SOB, abdominal pain, N/V/D. No dizziness.     MEDICATIONS  (STANDING):  lactated ringers. 1000 milliLiter(s) (75 mL/Hr) IV Continuous <Continuous>  diltiazem    milliGRAM(s) Oral daily  atorvastatin 20 milliGRAM(s) Oral at bedtime  levothyroxine 150 MICROGram(s) Oral daily  enoxaparin Injectable 30 milliGRAM(s) SubCutaneous every 24 hours  famotidine    Tablet 20 milliGRAM(s) Oral every 12 hours    MEDICATIONS  (PRN):  acetaminophen   Tablet 650 milliGRAM(s) Oral every 6 hours PRN For Temp greater than 38 C (100.4 F)  acetaminophen   Tablet. 650 milliGRAM(s) Oral every 6 hours PRN Mild Pain (1 - 3)  oxyCODONE    IR 5 milliGRAM(s) Oral every 4 hours PRN moderate to severe pain  HYDROmorphone  Injectable 0.5 milliGRAM(s) IV Push every 6 hours PRN breakthrough pain  aluminum hydroxide/magnesium hydroxide/simethicone Suspension 30 milliLiter(s) Oral four times a day PRN Indigestion  ondansetron Injectable 4 milliGRAM(s) IV Push every 6 hours PRN Nausea and/or Vomiting  magnesium hydroxide Suspension 30 milliLiter(s) Oral daily PRN Constipation      Vital Signs Last 24 Hrs  T(C): 37.2 (07-14-17 @ 09:31), Max: 38.3 (07-13-17 @ 14:48)  HR: 85 (07-14-17 @ 09:31) (79 - 91)  BP: 87/36 (07-14-17 @ 09:31) (87/36 - 152/54)  RR: 17 (07-14-17 @ 09:31) (13 - 24)  SpO2: 98% (07-14-17 @ 09:31) (93% - 100%)  CAPILLARY BLOOD GLUCOSE        I&O's Summary    13 Jul 2017 07:01  -  14 Jul 2017 07:00  --------------------------------------------------------  IN: 400 mL / OUT: 1024 mL / NET: -624 mL        PHYSICAL EXAM:  GENERAL: NAD, well-developed  HEAD:  Atraumatic, Normocephalic  EYES: EOMI, PERRLA, conjunctiva and sclera clear  NECK: Supple, No JVD  CHEST/LUNG: Clear to auscultation bilaterally; No wheeze  HEART: Regular rate and rhythm; No murmurs, rubs, or gallops  ABDOMEN: Soft, Nontender, Nondistended; Bowel sounds present  EXTREMITIES:  2+ Peripheral Pulses, No clubbing, cyanosis, or edema  PSYCH: AAOx3  NEUROLOGY: non-focal  SKIN: No rashes or lesions    LABS:                        8.0    8.97  )-----------( 178      ( 14 Jul 2017 05:47 )             23.9     07-14    137  |  102  |  20  ----------------------------<  178<H>  4.3   |  24  |  0.84    Ca    8.4      14 Jul 2017 05:47      PT/INR - ( 13 Jul 2017 06:50 )   PT: 13.0 SEC;   INR: 1.16          PTT - ( 13 Jul 2017 06:50 )  PTT:28.7 SEC          RADIOLOGY & ADDITIONAL TESTS:    Imaging Personally Reviewed:    Consultant(s) Notes Reviewed:      Care Discussed with Consultants/Other Providers: Ortho

## 2017-07-14 NOTE — PHYSICAL THERAPY INITIAL EVALUATION ADULT - PERTINENT HX OF CURRENT PROBLEM, REHAB EVAL
Pt is a 89 y/o female who presented from a fall. Patient tripped over her feet and fell onto her right side on her way to the movies.

## 2017-07-14 NOTE — PROGRESS NOTE ADULT - PROBLEM SELECTOR PLAN 2
stable. continue diltiazem will transfuse 1 Unit PRBCs  monitoring H/H post op anemia  will transfuse 1 Unit PRBCs  monitoring H/H

## 2017-07-14 NOTE — PROGRESS NOTE ADULT - SUBJECTIVE AND OBJECTIVE BOX
No acute o/n events.  Pain controlled    Vital Signs Last 24 Hrs  T(C): 37.2 (14 Jul 2017 01:23), Max: 38.3 (13 Jul 2017 14:48)  T(F): 98.9 (14 Jul 2017 01:23), Max: 100.9 (13 Jul 2017 14:48)  HR: 88 (14 Jul 2017 01:23) (85 - 91)  BP: 106/50 (14 Jul 2017 01:23) (91/49 - 152/54)  BP(mean): --  RR: 17 (14 Jul 2017 01:23) (13 - 24)  SpO2: 98% (14 Jul 2017 01:23) (93% - 100%)    RLE: proximal dressing saturated, distal dressing c/d/i  EHL/FHL/GS/TA intact  S/S/DP/SP/T SILT  BCR, soft compartments

## 2017-07-14 NOTE — PROGRESS NOTE ADULT - ASSESSMENT
89F s/p R hip IMN POD#1  -pain control  -DVT ppx  -WBAT  -PT/OOB  -f/u labs  -IS      Enrrique HOOD [phone]

## 2017-07-14 NOTE — PROGRESS NOTE ADULT - ASSESSMENT
89 y.o. Female w/ hx HTN, hypothyroidism, s/p AVR 10 years ago p/w mechanical Fall c/w Right hip fracture s/p repair POD #1

## 2017-07-14 NOTE — DISCHARGE NOTE ADULT - CARE PLAN
Principal Discharge DX:	Hip fracture  Goal:	ambulation  Instructions for follow-up, activity and diet:	weight bear as tolerated  resume same diet as prior to surgery   Dr Maza 1 week call for appt [phone] Principal Discharge DX:	Hip fracture  Goal:	ambulation  Instructions for follow-up, activity and diet:	weight bear as tolerated  resume same diet as prior to surgery   Dr Maza 1 week call for appt [phone] Principal Discharge DX:	Hip fracture  Goal:	ambulation  Instructions for follow-up, activity and diet:	weight bear as tolerated  resume same diet as prior to surgery   Dr Maza 1 week call for appt [phone] Principal Discharge DX:	Hip fracture  Goal:	ambulation  Instructions for follow-up, activity and diet:	weight bear as tolerated  resume same diet as prior to surgery   Dr Maza 1 week call for appt [phone] Principal Discharge DX:	Hip fracture  Goal:	ambulation  Instructions for follow-up, activity and diet:	weight bear as tolerated  resume same diet as prior to surgery   Dr Maza 1 week call for appt [phone]

## 2017-07-14 NOTE — PROGRESS NOTE ADULT - SUBJECTIVE AND OBJECTIVE BOX
Afebrile, VSS alert and oriented  Patient has minimal pain   No calf pain in either leg  NV intact moves the toes of both feet well  HCT 27 to be monitored  lovenox for DVT PPX  Dressing intact.    Plan:  OOB to chair  PT for WBAT right leg  Monitor HCT  Pain control

## 2017-07-14 NOTE — DISCHARGE NOTE ADULT - PLAN OF CARE
ambulation weight bear as tolerated  resume same diet as prior to surgery   Dr Maza 1 week call for appt [phone]

## 2017-07-14 NOTE — DISCHARGE NOTE ADULT - CARE PROVIDER_API CALL
Jairo Maza), [facility] Orthopaedic Surgery  [address] [place] [address]  Phone: [phone]  Fax: [phone]

## 2017-07-14 NOTE — DISCHARGE NOTE ADULT - CONDITIONS AT DISCHARGE
stable, right hip dressing in place clean dry and intact, stable, right hip dressing in place clean dry and intact, tolerating diet stable, right hip dressing in place clean dry and intact, tolerating diet. incontinent of urine. need assist ence with all the care. out of bed with assist. and walker

## 2017-07-14 NOTE — DISCHARGE NOTE ADULT - MEDICATION SUMMARY - MEDICATIONS TO TAKE
I will START or STAY ON the medications listed below when I get home from the hospital:    oxyCODONE 5 mg oral tablet  -- 1 tab(s) by mouth every 4 hours, As needed, moderate to severe pain  -- Indication: For Pain control    acetaminophen 325 mg oral tablet  -- 2 tab(s) by mouth every 6 hours, As needed, For Temp greater than 38 C (100.4 F)  -- Indication: For fever    acetaminophen 325 mg oral tablet  -- 2 tab(s) by mouth every 6 hours, As needed, Mild Pain (1 - 3)  -- Indication: For Pain control    aluminum hydroxide-magnesium hydroxide 200 mg-200 mg/5 mL oral suspension  -- 30 milliliter(s) by mouth 4 times a day, As needed, Indigestion  -- Indication: For indigestion    magnesium hydroxide 8% oral suspension  -- 30 milliliter(s) by mouth once a day, As needed, Constipation  -- Indication: For Constipation    Cartia  mg/24 hours oral capsule, extended release  -- 1 cap(s) by mouth once a day  -- Indication: For Home med    enoxaparin  -- 30 milligram(s) subcutaneous once a day  -- Indication: For dvt ppx    Lipitor 20 mg oral tablet  -- 1 tab(s) by mouth once a day  -- Indication: For Home med    Lasix 40 mg oral tablet  -- 1 tab(s) by mouth once a day  -- Indication: For Home med    famotidine 20 mg oral tablet  -- 1 tab(s) by mouth every 12 hours  -- Indication: For GI ppx    Colace 100 mg oral capsule  -- 1 cap(s) by mouth 2 times a day  -- Indication: For Constipation    Synthroid 150 mcg (0.15 mg) oral tablet  -- 1 tab(s) by mouth once a day  -- Indication: For Home med

## 2017-07-14 NOTE — DISCHARGE NOTE ADULT - PATIENT PORTAL LINK FT
“You can access the [facility] Patient Portal, offered by [facility], by registering with the following website: [email or web address]”

## 2017-07-14 NOTE — PROGRESS NOTE ADULT - SUBJECTIVE AND OBJECTIVE BOX
ANESTHESIA POSTOP CHECK    89y Female POSTOP DAY 1 S/P ORIF R hip    Vital Signs Last 24 Hrs  T(C): 37.2 (14 Jul 2017 09:31), Max: 38.3 (13 Jul 2017 14:48)  T(F): 98.9 (14 Jul 2017 09:31), Max: 100.9 (13 Jul 2017 14:48)  HR: 85 (14 Jul 2017 09:31) (79 - 91)  BP: 87/36 (14 Jul 2017 09:31) (87/36 - 152/54)  BP(mean): --  RR: 17 (14 Jul 2017 09:31) (13 - 24)  SpO2: 98% (14 Jul 2017 09:31) (93% - 100%)  I&O's Summary    13 Jul 2017 07:01  -  14 Jul 2017 07:00  --------------------------------------------------------  IN: 400 mL / OUT: 1024 mL / NET: -624 mL        [x ] NO APPARENT ANESTHESIA COMPLICATIONS      Comments:

## 2017-07-14 NOTE — DISCHARGE NOTE ADULT - HOSPITAL COURSE
89 yo is s/p above without any intraoperative complications.  Pt is doing well and stable for discharge.  Pt is tolerating physical therapy: WBAT, TOTAL HIP PRECAUTIONS, gait training.  Leave dressing ON until office postop visit   Pt is on Lovenox 30mg  for DVT prophylaxis.  Follow up with surgeon in 1 week call for appt. 89 yo is s/p above without any intraoperative complications.  Pt is doing well and stable for discharge.  Pt is tolerating physical therapy: WBAT, TOTAL HIP PRECAUTIONS, gait training.  Leave dressing ON until office postop visit   Pt is on Lovenox 30mg  for DVT prophylaxis.  Follow up with surgeon in 1 week call for appt.     Pt unable to void 8 hours after morales removed on day of discharge. morales replaced. Will send to rehab with morales catheter in. Repeat trial of void per rehab facility

## 2017-07-15 VITALS
TEMPERATURE: 98 F | HEART RATE: 71 BPM | SYSTOLIC BLOOD PRESSURE: 100 MMHG | DIASTOLIC BLOOD PRESSURE: 51 MMHG | RESPIRATION RATE: 16 BRPM | OXYGEN SATURATION: 95 %

## 2017-07-15 LAB
BUN SERPL-MCNC: 27 MG/DL — HIGH (ref 7–23)
CALCIUM SERPL-MCNC: 9.1 MG/DL — SIGNIFICANT CHANGE UP (ref 8.4–10.5)
CHLORIDE SERPL-SCNC: 104 MMOL/L — SIGNIFICANT CHANGE UP (ref 98–107)
CO2 SERPL-SCNC: 26 MMOL/L — SIGNIFICANT CHANGE UP (ref 22–31)
CREAT SERPL-MCNC: 1.04 MG/DL — SIGNIFICANT CHANGE UP (ref 0.5–1.3)
GLUCOSE SERPL-MCNC: 134 MG/DL — HIGH (ref 70–99)
HCT VFR BLD CALC: 27.8 % — LOW (ref 34.5–45)
HGB BLD-MCNC: 9.3 G/DL — LOW (ref 11.5–15.5)
MCHC RBC-ENTMCNC: 30.3 PG — SIGNIFICANT CHANGE UP (ref 27–34)
MCHC RBC-ENTMCNC: 33.5 % — SIGNIFICANT CHANGE UP (ref 32–36)
MCV RBC AUTO: 90.6 FL — SIGNIFICANT CHANGE UP (ref 80–100)
NRBC # FLD: 0 — SIGNIFICANT CHANGE UP
PLATELET # BLD AUTO: 211 K/UL — SIGNIFICANT CHANGE UP (ref 150–400)
PMV BLD: 10.9 FL — SIGNIFICANT CHANGE UP (ref 7–13)
POTASSIUM SERPL-MCNC: 4.6 MMOL/L — SIGNIFICANT CHANGE UP (ref 3.5–5.3)
POTASSIUM SERPL-SCNC: 4.6 MMOL/L — SIGNIFICANT CHANGE UP (ref 3.5–5.3)
RBC # BLD: 3.07 M/UL — LOW (ref 3.8–5.2)
RBC # FLD: 15.9 % — HIGH (ref 10.3–14.5)
SODIUM SERPL-SCNC: 139 MMOL/L — SIGNIFICANT CHANGE UP (ref 135–145)
WBC # BLD: 11.84 K/UL — HIGH (ref 3.8–10.5)
WBC # FLD AUTO: 11.84 K/UL — HIGH (ref 3.8–10.5)

## 2017-07-15 PROCEDURE — 99233 SBSQ HOSP IP/OBS HIGH 50: CPT

## 2017-07-15 RX ORDER — FAMOTIDINE 10 MG/ML
1 INJECTION INTRAVENOUS
Qty: 0 | Refills: 0 | COMMUNITY
Start: 2017-07-15

## 2017-07-15 RX ORDER — OXYCODONE HYDROCHLORIDE 5 MG/1
1 TABLET ORAL
Qty: 0 | Refills: 0 | COMMUNITY
Start: 2017-07-15

## 2017-07-15 RX ORDER — ACETAMINOPHEN 500 MG
2 TABLET ORAL
Qty: 0 | Refills: 0 | COMMUNITY
Start: 2017-07-15

## 2017-07-15 RX ORDER — MAGNESIUM HYDROXIDE 400 MG/1
30 TABLET, CHEWABLE ORAL
Qty: 0 | Refills: 0 | COMMUNITY
Start: 2017-07-15

## 2017-07-15 RX ORDER — ENOXAPARIN SODIUM 100 MG/ML
30 INJECTION SUBCUTANEOUS
Qty: 0 | Refills: 0 | COMMUNITY
Start: 2017-07-15

## 2017-07-15 RX ADMIN — FAMOTIDINE 20 MILLIGRAM(S): 10 INJECTION INTRAVENOUS at 06:15

## 2017-07-15 RX ADMIN — Medication 180 MILLIGRAM(S): at 06:15

## 2017-07-15 RX ADMIN — OXYCODONE HYDROCHLORIDE 5 MILLIGRAM(S): 5 TABLET ORAL at 14:02

## 2017-07-15 RX ADMIN — ENOXAPARIN SODIUM 30 MILLIGRAM(S): 100 INJECTION SUBCUTANEOUS at 07:31

## 2017-07-15 RX ADMIN — OXYCODONE HYDROCHLORIDE 5 MILLIGRAM(S): 5 TABLET ORAL at 13:20

## 2017-07-15 RX ADMIN — Medication 150 MICROGRAM(S): at 06:16

## 2017-07-15 NOTE — PROGRESS NOTE ADULT - SUBJECTIVE AND OBJECTIVE BOX
Afebrile, VSS alert and oriented  Transfused one unit yesterday  Repeat labs today  Dressings dry wounds healing  lovenox for DVT PPX  No calf pain  NV intact  Pain fairly well controlled    Plan:  Monitor HCT  PT if stable and then to rehab

## 2017-07-15 NOTE — PROGRESS NOTE ADULT - SUBJECTIVE AND OBJECTIVE BOX
Patient is a 89y old  Female who presents with a chief complaint of s/p IM nail right hip fracture 7/13 (14 Jul 2017 11:50)      SUBJECTIVE / OVERNIGHT EVENTS: Patientfrustrated by inability to get out of chair on her own. Awaiting urination.    MEDICATIONS  (STANDING):  lactated ringers. 1000 milliLiter(s) (75 mL/Hr) IV Continuous <Continuous>  diltiazem    milliGRAM(s) Oral daily  atorvastatin 20 milliGRAM(s) Oral at bedtime  levothyroxine 150 MICROGram(s) Oral daily  enoxaparin Injectable 30 milliGRAM(s) SubCutaneous every 24 hours  famotidine    Tablet 20 milliGRAM(s) Oral every 12 hours    MEDICATIONS  (PRN):  acetaminophen   Tablet 650 milliGRAM(s) Oral every 6 hours PRN For Temp greater than 38 C (100.4 F)  acetaminophen   Tablet. 650 milliGRAM(s) Oral every 6 hours PRN Mild Pain (1 - 3)  oxyCODONE    IR 5 milliGRAM(s) Oral every 4 hours PRN moderate to severe pain  HYDROmorphone  Injectable 0.5 milliGRAM(s) IV Push every 6 hours PRN breakthrough pain  aluminum hydroxide/magnesium hydroxide/simethicone Suspension 30 milliLiter(s) Oral four times a day PRN Indigestion  ondansetron Injectable 4 milliGRAM(s) IV Push every 6 hours PRN Nausea and/or Vomiting  magnesium hydroxide Suspension 30 milliLiter(s) Oral daily PRN Constipation  bisacodyl Suppository 10 milliGRAM(s) Rectal daily PRN If no bowel movement      Vital Signs Last 24 Hrs  T(C): 37.2 (07-15-17 @ 09:54), Max: 37.2 (07-14-17 @ 13:57)  HR: 77 (07-15-17 @ 09:54) (69 - 85)  BP: 101/49 (07-15-17 @ 09:54) (95/52 - 108/49)  RR: 17 (07-15-17 @ 09:54) (17 - 17)  SpO2: 95% (07-15-17 @ 09:54) (95% - 99%)  CAPILLARY BLOOD GLUCOSE        I&O's Summary    14 Jul 2017 07:01  -  15 Jul 2017 07:00  --------------------------------------------------------  IN: 0 mL / OUT: 750 mL / NET: -750 mL    15 Jul 2017 07:01  -  15 Jul 2017 12:38  --------------------------------------------------------  IN: 0 mL / OUT: 0 mL / NET: 0 mL        PHYSICAL EXAM:  GENERAL: NAD, well-developed  HEAD:  Atraumatic, Normocephalic  EYES: EOMI, PERRLA, conjunctiva and sclera clear  NECK: Supple, No JVD  CHEST/LUNG: Clear to auscultation bilaterally; No wheeze  HEART: Regular rate and rhythm; No murmurs, rubs, or gallops  ABDOMEN: Soft, Nontender, Nondistended; Bowel sounds present  EXTREMITIES:  2+ Peripheral Pulses, No clubbing, cyanosis, or edema  PSYCH: AAOx3  NEUROLOGY: non-focal  SKIN: No rashes or lesions    LABS:                        9.3    11.84 )-----------( 211      ( 15 Jul 2017 06:32 )             27.8     07-15    139  |  104  |  27<H>  ----------------------------<  134<H>  4.6   |  26  |  1.04    Ca    9.1      15 Jul 2017 06:32          Other provider Notes Reviewed:  Ortho

## 2017-07-15 NOTE — PROGRESS NOTE ADULT - ASSESSMENT
89 y.o. Female w/ hx HTN, hypothyroidism, s/p AVR 10 years ago p/w mechanical Fall c/w Right hip fracture s/p repair POD #2      ·  Problem: Acute hip pain, right, POD#2 s/p R hip fx repair  Plan: pain controlled with Oxy IR prnpain controlled with pain regimen. PT. Plan d/c to [facility] when voids urine.      ·  Problem: Acute blood loss anemia.  Plan: post op anemia  s/p 1 Unit PRBCs on 7/14. Appropriate response.    monitoring H/HMonitor H/H. stable.       ·  Problem: Hypotension, unspecified hypotension type.  Plan: secondary to acute post op blood loss anemia.   monitoring BPs. Stable, improving      ·  Problem: Essential hypertension.  Plan: Hold diltiazem if low BPs; stabl eto resume with hold parameters      ·  Problem: Hypothyroidism, unspecified type.  Plan: c/w synthyroid.

## 2017-07-15 NOTE — PROGRESS NOTE ADULT - SUBJECTIVE AND OBJECTIVE BOX
No acute events overnight. Pain well controlled with pain medications. Patient worked with physical therapy.    Vitals: ICU Vital Signs Last 24 Hrs  T(C): 36.7 (15 Jul 2017 06:14), Max: 37.2 (14 Jul 2017 09:31)  T(F): 98 (15 Jul 2017 06:14), Max: 99 (14 Jul 2017 11:51)  HR: 74 (15 Jul 2017 06:14) (69 - 89)  BP: 108/49 (15 Jul 2017 06:14) (87/36 - 108/49)  BP(mean): --  ABP: --  ABP(mean): --  RR: 17 (15 Jul 2017 06:14) (17 - 18)  SpO2: 97% (15 Jul 2017 06:14) (97% - 99%)                          9.3    11.84 )-----------( 211      ( 15 Jul 2017 06:32 )             27.8   07-15    139  |  104  |  27<H>  ----------------------------<  134<H>  4.6   |  26  |  1.04    Ca    9.1      15 Jul 2017 06:32        Exam:  Gen: NAD  Motor: 5/5 EHL/FHL/TA/Gastrocnemius  Sensory: SILT DP/SP/S/S/T nerve distributions  Vascular: 2+ Dorsalis Pedis pulse  Dressing: Clean, Dry, Intact    A/P: 89 year old F s/p R IMN  - Pain Control  - Regular Diet  - PT/OT, OOB  - Rehab today

## 2018-10-04 NOTE — PATIENT PROFILE ADULT. - SURGICAL SITE INCISION
Consult- General Surgery   Masood Wolff 59 y o  female MRN: [id number]  Unit/Bed#:  Encounter: [id number]    Assessment/Plan     Assessment:  Left elbow mass    Plan:  I advised to undergo excision biopsy of left elbow mass under sedation in the near future  I discussed the operative procedure, risks, benefits and alternatives with the patient, she understood and agreed to proceed  History of Present Illness     HPI:  Masood Wolff is a 59 y o  female who presents to my office for evaluation of left elbow mass  The patient noted small lump from the left elbow for many years, increasing in size and causing discomfort and occasional pain  She denied having any drainage, redness or skin color changes  She does not recall any single event the provoked the mass  Review of Systems   Constitutional: Negative for chills and fever  HENT: Negative for nosebleeds and sore throat  Eyes: Negative for pain and discharge  Respiratory: Negative for cough and shortness of breath  Cardiovascular: Negative for chest pain and palpitations  Gastrointestinal: Negative for abdominal pain, constipation, diarrhea and nausea  Endocrine: Negative for cold intolerance and heat intolerance  Genitourinary: Negative for dysuria and hematuria  Neurological: Negative for seizures and headaches  Hematological: Negative for adenopathy  Does not bruise/bleed easily  Psychiatric/Behavioral: Negative for confusion  The patient is not nervous/anxious  Historical Information   Past Medical History:   Diagnosis Date    Acute laryngotracheitis     last assessed-5/13/2015    Depression     last assessed-5/5/2015    Poison ivy     resolved-5/6/2016     No past surgical history on file    Social History   History   Alcohol Use    Yes     Comment: occasional     History   Drug Use No     History   Smoking Status    Never Smoker   Smokeless Tobacco    Never Used     Family History: non-contributory    Meds/Allergies all medications and allergies reviewed     Current Outpatient Prescriptions:     betamethasone valerate (VALISONE) 0 1 % lotion, Apply 1 application topically 3 (three) times a day Apply sparingly to affected area(s), Disp: 60 mL, Rfl: 5    cycloSPORINE (RESTASIS) 0 05 % ophthalmic emulsion, Apply 1 % to eye as needed, Disp: , Rfl:     eszopiclone (LUNESTA) 2 mg tablet, Take 1 tablet (2 mg total) by mouth daily, Disp: 90 tablet, Rfl: 1    meloxicam (MOBIC) 7 5 mg tablet, Take 1 tablet by mouth daily as needed, Disp: , Rfl:   No Known Allergies    Objective     Current Vitals:   Blood Pressure: (!) 172/92 (10/04/18 0814)  Pulse: 62 (10/04/18 0814)  Temperature: 97 7 °F (36 5 °C) (10/04/18 0814)  Temp Source: Oral (10/04/18 0814)  Respirations: 14 (10/04/18 0814)  Height: 5' 4" (162 6 cm) (10/04/18 7556)  Weight - Scale: 67 9 kg (149 lb 12 8 oz) (10/04/18 0814)      Invasive Devices          No matching active lines, drains, or airways          Physical Exam   Constitutional: She is oriented to person, place, and time  She appears well-developed and well-nourished  No distress  HENT:   Head: Normocephalic  Mouth/Throat: No oropharyngeal exudate  Eyes: Pupils are equal, round, and reactive to light  No scleral icterus  Neck: Normal range of motion  Neck supple  Cardiovascular: Normal rate and regular rhythm  No murmur heard  Pulmonary/Chest: Effort normal and breath sounds normal  No respiratory distress  Abdominal: Soft  She exhibits no mass  There is no tenderness  Musculoskeletal: She exhibits no edema or tenderness  There is a 2 cm soft tissue mass on the left elbow, freely mobile, not adhered to deeper structures, independent from the joint  Lymphadenopathy:     She has no cervical adenopathy  Neurological: She is alert and oriented to person, place, and time  No cranial nerve deficit  Skin: No rash noted  No erythema  Psychiatric: She has a normal mood and affect   Her behavior is normal  no

## 2024-05-02 NOTE — ED ADULT TRIAGE NOTE - NS ED NOTE AC HIGH RISK COUNTRIES
No
knee replacement  exercise worksheet  cold compression therapy  pain management   caring for my incision